# Patient Record
Sex: FEMALE | Race: WHITE | NOT HISPANIC OR LATINO | Employment: FULL TIME | URBAN - METROPOLITAN AREA
[De-identification: names, ages, dates, MRNs, and addresses within clinical notes are randomized per-mention and may not be internally consistent; named-entity substitution may affect disease eponyms.]

---

## 2018-10-10 ENCOUNTER — HOSPITAL ENCOUNTER (EMERGENCY)
Facility: MEDICAL CENTER | Age: 33
End: 2018-10-10
Attending: EMERGENCY MEDICINE
Payer: COMMERCIAL

## 2018-10-10 VITALS
TEMPERATURE: 98.6 F | DIASTOLIC BLOOD PRESSURE: 80 MMHG | SYSTOLIC BLOOD PRESSURE: 133 MMHG | RESPIRATION RATE: 18 BRPM | WEIGHT: 138.67 LBS | BODY MASS INDEX: 21.02 KG/M2 | HEART RATE: 60 BPM | HEIGHT: 68 IN | OXYGEN SATURATION: 98 %
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
## 2018-10-10 PROCEDURE — A9270 NON-COVERED ITEM OR SERVICE: HCPCS | Performed by: EMERGENCY MEDICINE

## 2018-10-10 PROCEDURE — 700102 HCHG RX REV CODE 250 W/ 637 OVERRIDE(OP): Performed by: EMERGENCY MEDICINE

## 2018-10-10 PROCEDURE — 96374 THER/PROPH/DIAG INJ IV PUSH: CPT

## 2018-10-10 PROCEDURE — 96376 TX/PRO/DX INJ SAME DRUG ADON: CPT

## 2018-10-10 PROCEDURE — 700111 HCHG RX REV CODE 636 W/ 250 OVERRIDE (IP): Performed by: EMERGENCY MEDICINE

## 2018-10-10 PROCEDURE — 700101 HCHG RX REV CODE 250: Performed by: EMERGENCY MEDICINE

## 2018-10-10 PROCEDURE — 99284 EMERGENCY DEPT VISIT MOD MDM: CPT

## 2018-10-10 RX ORDER — AMOXICILLIN AND CLAVULANATE POTASSIUM 875; 125 MG/1; MG/1
1 TABLET, FILM COATED ORAL 2 TIMES DAILY
Qty: 14 TAB | Refills: 0 | Status: SHIPPED | OUTPATIENT
Start: 2018-10-10 | End: 2018-10-17

## 2018-10-10 RX ORDER — AMOXICILLIN AND CLAVULANATE POTASSIUM 875; 125 MG/1; MG/1
1 TABLET, FILM COATED ORAL ONCE
Status: COMPLETED | OUTPATIENT
Start: 2018-10-10 | End: 2018-10-10

## 2018-10-10 RX ORDER — LORAZEPAM 1 MG/1
1 TABLET ORAL ONCE
Status: COMPLETED | OUTPATIENT
Start: 2018-10-10 | End: 2018-10-10

## 2018-10-10 RX ORDER — HYDROCODONE BITARTRATE AND ACETAMINOPHEN 5; 325 MG/1; MG/1
1-2 TABLET ORAL EVERY 6 HOURS PRN
Qty: 15 TAB | Refills: 0 | Status: SHIPPED | OUTPATIENT
Start: 2018-10-10 | End: 2018-10-12

## 2018-10-10 RX ORDER — MORPHINE SULFATE 4 MG/ML
4 INJECTION, SOLUTION INTRAMUSCULAR; INTRAVENOUS ONCE
Status: COMPLETED | OUTPATIENT
Start: 2018-10-10 | End: 2018-10-10

## 2018-10-10 RX ORDER — LIDOCAINE 50 MG/G
OINTMENT TOPICAL ONCE
Status: COMPLETED | OUTPATIENT
Start: 2018-10-10 | End: 2018-10-10

## 2018-10-10 RX ADMIN — MORPHINE SULFATE 4 MG: 4 INJECTION INTRAVENOUS at 17:46

## 2018-10-10 RX ADMIN — LIDOCAINE: 50 OINTMENT TOPICAL at 16:29

## 2018-10-10 RX ADMIN — AMOXICILLIN AND CLAVULANATE POTASSIUM 1 TABLET: 875; 125 TABLET, FILM COATED ORAL at 18:38

## 2018-10-10 RX ADMIN — MORPHINE SULFATE 4 MG: 4 INJECTION INTRAVENOUS at 15:05

## 2018-10-10 RX ADMIN — LORAZEPAM 1 MG: 1 TABLET ORAL at 18:00

## 2018-10-10 ASSESSMENT — PAIN SCALES - GENERAL: PAINLEVEL_OUTOF10: 4

## 2018-10-10 NOTE — ED TRIAGE NOTES
.Lilli Conroy  .  Chief Complaint   Patient presents with   • Dog Bite     patient with was bite by her dog on her nose.      Patient to triage with above complaint, patient is a transfer from [place].  Bleeding controlled. PIV in placed.

## 2018-10-10 NOTE — ED PROVIDER NOTES
"ED Provider Note    CHIEF COMPLAINT  Chief Complaint   Patient presents with   • Dog Bite     patient with was bite by her dog on her nose.        HPI  Lilli Conroy is a 33 y.o. female who presents with a dog bite to the face.  Was bitten by her dog.  Her dog has his shots.  She bent down to kiss him goodbye near his food bowl.  He bit her on the face.  She sustained a significant laceration to her nose.  She was seen at [facility] and referred here for plastic surgery availability.  She received IV antibiotic and a tetanus booster prior to arrival.  She has persistent pain over her nose which is significant and constant.  It is coming back after her pain medication    REVIEW OF SYSTEMS  Pertinent negative: As above    PAST MEDICAL HISTORY  History reviewed. No pertinent past medical history.    SOCIAL HISTORY  Social History   Substance Use Topics   • Smoking status: Current Every Day Smoker     Packs/day: 0.50     Types: Cigarettes   • Smokeless tobacco: Never Used   • Alcohol use No       SURGICAL HISTORY  History reviewed. No pertinent surgical history.    ALLERGIES  No Known Allergies    PHYSICAL EXAM  VITAL SIGNS: /97   Pulse 88   Temp 37 °C (98.6 °F)   Resp 17   Ht 1.727 m (5' 8\")   Wt 62.9 kg (138 lb 10.7 oz)   SpO2 98%   BMI 21.08 kg/m²    Constitutional: Awake and alert. Nontoxic  HENT: There is a small laceration over the left maxilla.  There is a tissue defect over the left nostril with exposed dense underlying connective tissue.  There is no active bleeding.  Eyes: Grossly normal  Neck: Normal range of motion  Cardiovascular: Normal heart rate   Thorax & Lungs: No respiratory distress  Skin: See above  Extremities: Well perfused  Psychiatric: Affect normal      COURSE & MEDICAL DECISION MAKING  Presents with significant laceration/injury to her left nostril and face.  She is already received antibiotic and tetanus booster.  She was given IV analgesic here.  I consulted Dr." Hebert.  He will see the patient in the emergency department.      FINAL IMPRESSION  1.  Dog bite to the face          This dictation was created using voice recognition software. The accuracy of the dictation is limited to the abilities of the software.  The nursing notes were reviewed and certain aspects of this information were incorporated into this note.      Electronically signed by: Tino Rose, 10/10/2018 3:16 PM

## 2018-10-11 ENCOUNTER — HOSPITAL ENCOUNTER (OUTPATIENT)
Facility: MEDICAL CENTER | Age: 33
End: 2018-10-11
Attending: PLASTIC SURGERY | Admitting: PLASTIC SURGERY
Payer: COMMERCIAL

## 2018-10-11 NOTE — OR NURSING
Pt called about pre admitting [place] N[place] instructions given to pt

## 2018-10-11 NOTE — ED NOTES
Discharge orders received from ERP. New prescriptions received x 1. Provided education and patient demonstrated understanding. Pt ambulatory off unit. All personal belonging with patient.   Instructed on home care and follow up per plastic surgeon- Ambrosio

## 2018-10-11 NOTE — CONSULTS
DATE OF SERVICE:  10/10/2018    CHIEF COMPLAINT:  Dog bite to nose.    HISTORY OF PRESENT ILLNESS:  Patient is a 33-year-old white female who was   bitten by her dog in the nose earlier this morning.  She eventually went to   the hospital in [place] and was eventually transferred to [facility].    She had a significant avulsion of skin from her nose.    PAST MEDICAL HISTORY:  Otherwise, she is in good medical health.    ALLERGIES:  She has no known drug allergies.    MEDICATIONS:  Takes no chronic medications.    SOCIAL HISTORY:  She does not smoke.    REVIEW OF SYSTEMS:  Negative for headache, fever, visual disturbances, nausea,   vomiting, chest pain, cough, abdominal pain, hematochezia, melena, or   diarrhea.    PHYSICAL EXAMINATION:  GENERAL:  She is very distraught.  She is very tearful.  She is crying and is   again quite distraught.  HEENT:  She is normocephalic, other than significant amount of tissue that is   missing from her nose.  This involves the nasal tip as well as the columella   and extends over the soft triangle as well.  This is mostly on the left side   it involves a significant portion of the nasal tip and the columella.  There   is no missing cartilage.  There is no missing mucosa.  The remainder of the   head and neck  examination is unremarkable.  CHEST:  Clear.  ABDOMEN:  Soft.  EXTREMITIES:  Without any injury.    ASSESSMENT AND PLAN:  I discussed the situation with the patient and her   .  He is obviously able to assess this a little bit better.  I think   this could require a forehead flap if we are to have the best result possible.    This is obviously somewhat disconcerting when this was described to them and   she is not 100% sure she is ready to do this at this time while this is   something that I would recommend being done in the near future.  It is   definitely not something that has to be done emergently and could be set up as   an outpatient surgery.  She would  rather do this.  I think that certainly not   inappropriate.  What we will do is get her information and then we will see   if we can arrange a forehead flap here in the next several days when it is a   mutually convenient time.  In the meantime, my advice is that she have this   cleaned with soap and water and cover this with an antibiotic ointment until   we get her into the operating room whereas we will contact her as soon as we   can and get this set up when it is convenient for her.       ____________________________________     KATIE BAUTISTA MD    [facility] / [facility]    DD:  10/10/2018 18:23:46  DT:  10/10/2018 21:45:10    D#:  [id number]  Job#:  [id number]

## 2018-10-11 NOTE — ED PROVIDER NOTES
ED Provider Note    Patient was seen by Dr. Ambrosio, who discussed with her the options.  Patient elects to be discharged home with follow up for outpatient surgery.